# Patient Record
Sex: MALE | Race: WHITE | Employment: OTHER | URBAN - NONMETROPOLITAN AREA
[De-identification: names, ages, dates, MRNs, and addresses within clinical notes are randomized per-mention and may not be internally consistent; named-entity substitution may affect disease eponyms.]

---

## 2018-01-01 ENCOUNTER — SNF VISIT (OUTPATIENT)
Dept: FAMILY MEDICINE CLINIC | Facility: CLINIC | Age: 83
End: 2018-01-01

## 2018-01-01 ENCOUNTER — TELEPHONE (OUTPATIENT)
Dept: FAMILY MEDICINE CLINIC | Facility: CLINIC | Age: 83
End: 2018-01-01

## 2018-01-01 ENCOUNTER — OFFICE VISIT (OUTPATIENT)
Dept: FAMILY MEDICINE CLINIC | Facility: CLINIC | Age: 83
End: 2018-01-01
Payer: MEDICARE

## 2018-01-01 ENCOUNTER — MED REC SCAN ONLY (OUTPATIENT)
Dept: FAMILY MEDICINE CLINIC | Facility: CLINIC | Age: 83
End: 2018-01-01

## 2018-01-01 VITALS
HEART RATE: 94 BPM | DIASTOLIC BLOOD PRESSURE: 60 MMHG | SYSTOLIC BLOOD PRESSURE: 118 MMHG | TEMPERATURE: 100 F | OXYGEN SATURATION: 97 %

## 2018-01-01 DIAGNOSIS — Z28.21 INFLUENZA VACCINATION DECLINED: ICD-10-CM

## 2018-01-01 DIAGNOSIS — Z02.9 ENCOUNTER FOR ADMINISTRATIVE EXAMINATIONS: Primary | ICD-10-CM

## 2018-01-01 DIAGNOSIS — Z28.21 PNEUMOCOCCAL VACCINATION DECLINED: ICD-10-CM

## 2018-01-01 DIAGNOSIS — Z53.29 REFUSAL OF CARE BY PATIENT: ICD-10-CM

## 2018-01-01 DIAGNOSIS — Z74.1 REQUIRES ASSISTANCE WITH ACTIVITIES OF DAILY LIVING (ADL): ICD-10-CM

## 2018-01-01 PROCEDURE — 99204 OFFICE O/P NEW MOD 45 MIN: CPT | Performed by: FAMILY MEDICINE

## 2018-01-01 PROCEDURE — 99307 SBSQ NF CARE SF MDM 10: CPT | Performed by: FAMILY MEDICINE

## 2018-10-29 NOTE — TELEPHONE ENCOUNTER
Beverly Gould states that pt is being removed current living facility as they feel he is not capable of taking care of himself. Beverly Gould is wanting to know if Dr. Molly Ramos will fill out the OBRA from required to place pt into nursing facility.  Elena Mckeon that I

## 2018-10-29 NOTE — TELEPHONE ENCOUNTER
PT HAS APPT THIS AFTERNOON FOR EVAL BEFORE GOING TO [facility], NEED SOMETHING COMPLETED, CALL DIANDRA

## 2018-10-29 NOTE — TELEPHONE ENCOUNTER
Fax received from [facility]: LINDA Cruz: New resident , Angel Brennan, is being admitted to our facility today. Would you like to follow?

## 2018-10-31 PROBLEM — Z28.21 INFLUENZA VACCINATION DECLINED: Status: ACTIVE | Noted: 2018-01-01

## 2018-10-31 PROBLEM — Z74.1 REQUIRES ASSISTANCE WITH ACTIVITIES OF DAILY LIVING (ADL): Status: ACTIVE | Noted: 2018-01-01

## 2018-10-31 NOTE — PROGRESS NOTES
Monisha Singer is a 80year old male. No chief complaint on file. Subjective   HPI:   Patient here with son  He is not capable of managing his care at prior arrangement at this time  He will admit to [facility] in [place] today.     He offers no com office    SKIN: no rashes,no suspicious lesions  HEENT: atraumatic, normocephalic,NECK: supple,no adenopathy,no bruits  LUNGS: clear to auscultation  CARDIO: RRR without murmur  GI: good BS's,no masses, HSM or tenderness  EXTREMITIES: no cyanosis, clubbing
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
## 2018-12-13 NOTE — TELEPHONE ENCOUNTER
Nikhil Mcintyre states that pt's O2 has gone up to 99% but pt refused to go to hospital. Pt is still combative though.    Dr. Corley Pair aware

## 2018-12-13 NOTE — TELEPHONE ENCOUNTER
Pt. Refused to get sent out/pts. Oxygen is back up to 99. Pt.  Refused to be evaluated at the hospital/

## 2018-12-13 NOTE — PROGRESS NOTES
Kofi Nation is a 80year old male.     EXAM DONE AT [facility]  Denies pain and also denies  Issues with sleep and feeding    entire chart reviewed  And POC reviewed with the nursing staff      HPI:   Patient pain on exertion  GI: denies abdominal pain and denies heartburn  NEURO: denies headaches    EXAM:   Vitals recorded at facility    GENERAL: well developed, well nourished,in no apparent distress  SKIN: no rashes,no suspicious lesions  MOOD  See above  LOWELL

## 2018-12-14 NOTE — TELEPHONE ENCOUNTER
Patient has become very combative to care. For this reason they need a psych consult. He has refused a psych consult in the facility therefore we need to do an involuntary discharge for this inpatient psych care as he cannot care for himself.   A letter w

## 2019-01-01 ENCOUNTER — TELEPHONE (OUTPATIENT)
Dept: FAMILY MEDICINE CLINIC | Facility: CLINIC | Age: 84
End: 2019-01-01

## 2019-01-01 RX ORDER — HEPARIN SODIUM 5000 [USP'U]/ML
5000 INJECTION, SOLUTION INTRAVENOUS; SUBCUTANEOUS 2 TIMES DAILY
Qty: 1 ML | Refills: 0 | COMMUNITY
Start: 2019-01-01

## 2019-01-01 RX ORDER — ONDANSETRON 4 MG/1
4 TABLET, ORALLY DISINTEGRATING ORAL EVERY 8 HOURS PRN
Qty: 1 TABLET | Refills: 0 | COMMUNITY
Start: 2019-01-01

## 2019-01-01 RX ORDER — DOCUSATE SODIUM 100 MG/1
100 CAPSULE, LIQUID FILLED ORAL 2 TIMES DAILY PRN
Qty: 1 CAPSULE | Refills: 0 | COMMUNITY
Start: 2019-01-01

## 2019-01-01 RX ORDER — MIRTAZAPINE 15 MG/1
7.5 TABLET, FILM COATED ORAL NIGHTLY
Qty: 1 TABLET | Refills: 0 | COMMUNITY
Start: 2019-01-01

## 2019-01-01 RX ORDER — LOPERAMIDE HYDROCHLORIDE 2 MG/1
4 TABLET ORAL EVERY 6 HOURS PRN
Qty: 1 TABLET | Refills: 0 | COMMUNITY
Start: 2019-01-01

## 2019-01-01 RX ORDER — ACETAMINOPHEN 500 MG
500 TABLET ORAL EVERY 6 HOURS PRN
Qty: 1 TABLET | Refills: 0 | COMMUNITY
Start: 2019-01-01

## 2019-01-01 RX ORDER — QUETIAPINE 25 MG/1
12.5 TABLET, FILM COATED ORAL NIGHTLY
Qty: 1 TABLET | Refills: 0 | COMMUNITY
Start: 2019-01-01

## 2019-01-01 RX ORDER — LEVOTHYROXINE SODIUM 0.05 MG/1
50 TABLET ORAL
Qty: 1 TABLET | Refills: 0 | COMMUNITY
Start: 2019-01-01

## 2019-01-04 NOTE — PROGRESS NOTES
Vince Pastor is a 80year old male.     EXAM DONE AT [facility]  Denies pain and also denies  Issues with sleep and feeding    entire chart reviewed  And POC reviewed with the nursing staff      HPI:   Patient UNKNOWN        No current outpatient medications on file prior to visit. No current facility-administered medications on file prior to visit. History reviewed. No pertinent past medical history.    Social History:  Social History    Tobacco Use      S

## 2019-01-18 NOTE — TELEPHONE ENCOUNTER
Sandra from [facility] states pt had an unwitness fall striking head on bed frame, no LOC, alert and oriented x 3. No broken skin, no swelling, no bruising. Pt is laying on his left side. Denies neck or back pain. Currently on heparin.  Pt does not w

## 2019-01-28 ENCOUNTER — TELEPHONE (OUTPATIENT)
Dept: FAMILY MEDICINE CLINIC | Facility: CLINIC | Age: 84
End: 2019-01-28

## 2019-01-28 NOTE — TELEPHONE ENCOUNTER
Call taken 1/27/19    Patient in decline  Given supplemental oxygen  Staff at side  Quietly decreased breathing rate  No grimacing  seemed painless and peaceful    Son was notified by the staff

## 2019-01-31 ENCOUNTER — MED REC SCAN ONLY (OUTPATIENT)
Dept: FAMILY MEDICINE CLINIC | Facility: CLINIC | Age: 84
End: 2019-01-31

## (undated) NOTE — LETTER
[facility] [place] [id number]  [phone]          Date: 2018      Patient Name: Justa Stiles    : 1926         To Whom it may concern:    I am the primary car